# Patient Record
Sex: FEMALE | Race: WHITE | Employment: OTHER | URBAN - METROPOLITAN AREA
[De-identification: names, ages, dates, MRNs, and addresses within clinical notes are randomized per-mention and may not be internally consistent; named-entity substitution may affect disease eponyms.]

---

## 2020-11-10 ENCOUNTER — HOSPITAL ENCOUNTER (OUTPATIENT)
Dept: LAB | Age: 70
Discharge: HOME | End: 2020-11-10
Payer: MEDICARE

## 2020-11-10 LAB — VIT B12 SERPL-MCNC: 1248 PG/ML (ref 211–911)

## 2020-11-10 PROCEDURE — 82607 VITAMIN B-12: CPT

## 2020-11-10 PROCEDURE — 36415 COLL VENOUS BLD VENIPUNCTURE: CPT

## 2020-11-11 LAB
FAX TO INFO,FAXT: NORMAL
FAX TO NUMBER,FAXN: NORMAL

## 2021-12-28 ENCOUNTER — TRANSCRIBE ORDER (OUTPATIENT)
Dept: SCHEDULING | Age: 71
End: 2021-12-28

## 2021-12-28 DIAGNOSIS — M79.605 LEFT LEG PAIN: Primary | ICD-10-CM

## 2022-02-04 ENCOUNTER — HOSPITAL ENCOUNTER (OUTPATIENT)
Dept: VASCULAR SURGERY | Age: 72
Discharge: HOME | End: 2022-02-04
Attending: PHYSICIAN ASSISTANT
Payer: MEDICARE

## 2022-02-04 DIAGNOSIS — M79.605 LEFT LEG PAIN: ICD-10-CM

## 2022-02-04 PROCEDURE — 93971 EXTREMITY STUDY: CPT

## 2024-04-29 ENCOUNTER — HOSPITAL ENCOUNTER (OUTPATIENT)
Facility: HOSPITAL | Age: 74
Discharge: HOME | End: 2024-05-02
Payer: MEDICARE

## 2024-04-29 ENCOUNTER — TRANSCRIBE ORDERS (OUTPATIENT)
Facility: HOSPITAL | Age: 74
End: 2024-04-29

## 2024-04-29 DIAGNOSIS — R41.3 MEMORY LOSS: Primary | ICD-10-CM

## 2024-04-29 DIAGNOSIS — R41.3 MEMORY LOSS: ICD-10-CM

## 2024-04-29 LAB
TSH SERPL DL<=0.05 MIU/L-ACNC: 0.59 UIU/ML (ref 0.36–3.74)
VIT B12 SERPL-MCNC: 675 PG/ML (ref 211–911)

## 2024-04-29 PROCEDURE — 82607 VITAMIN B-12: CPT

## 2024-04-29 PROCEDURE — 84443 ASSAY THYROID STIM HORMONE: CPT

## 2024-04-29 PROCEDURE — 36415 COLL VENOUS BLD VENIPUNCTURE: CPT

## 2024-05-13 ENCOUNTER — TELEPHONE (OUTPATIENT)
Age: 74
End: 2024-05-13

## 2024-05-13 NOTE — TELEPHONE ENCOUNTER
Returned call to the patient's daughter. Informed that a referral is needed prior to scheduling. She states she will contact [facility] and have them resend referral.

## 2024-05-13 NOTE — TELEPHONE ENCOUNTER
Sandra Shoen/Daughter states that patient was referred by Dr. Melvin from [facility] it was faxed over 2 weeks ago. Please call daughter to schedule the apptmt at [phone]

## 2024-06-07 NOTE — H&P
volume Rx bowel prep available    08/10/2023 GLIMEPIRIDE 4 MG TABLET TAKE 2 TABLETS BY MOUTH EVERY DAY N       insulin lispro (U-100) 100 unit/mL subcutaneous pen inject by subcutaneous route per prescriber's instructions. Insulin dosing requires individualization. N      05/01/2022 IPRATROPIUM 0.03% SPRAY SPRAY 1 SPRAY BY INTRANASAL ROUTE 2- 3 TIMES EVERY DAY IN EACH NOSTRIL N       isosorbide mononitrate ER 60 mg tablet,extended release 24 hr take 1 tablet by oral route  every day in the morning N      05/11/2023 Levemir U-100 Insulin 100 unit/mL subcutaneous solution INJECT 18 UNITS BY SUBCUTANEOUS ROUTE EVERY NIGHT AT BEDTIME N      02/20/2023 METFORMIN HCL 1,000 MG TABLET TAKE 1 TABLET BY MOUTH TWICE A DAY WITH BREAKFAST AND DINNER N       metoprolol succinate ER 50 mg tablet,extended release 24 hr take 1 tablet by oral route  every day N      08/11/2023 PRAMIPEXOLE 0.25 MG TABLET TAKE ONE TABLET BY MOUTH IN THE EVENING AND ONE TABLET BY MOUTH AT BEDTIME N      04/23/2023 TRAZODONE 50 MG TABLET TAKE 1 TABLET BY MOUTH EVERY DAY IN THE EVENING N        Allergies  Ingredient Reaction (Severity) Comment   BACITRACIN Bruising    BACITRACIN ZINC Bruising    NEOMYCIN SULFATE Bruising    POLYMYXIN B Bruising      Reviewed, no changes.        Review of Systems  System Neg/Pos Details   Constitutional Negative Chills, Fever, Malaise, Weight gain and Weight loss.   ENMT Negative Ear infections, Nasal congestion, Sinus Infection and Sore throat.   Eyes Negative Double vision and Eye pain.   Respiratory Negative Asthma, Chronic cough, Dyspnea, Pleuritic pain and Wheezing.   Cardio Negative Chest pain, Edema and Irregular heartbeat/palpitations.   GI Negative Abdominal pain, Change in bowel habits, Change in stool caliber, Constipation, Decreased appetite, Diarrhea, Dysphagia, Heartburn, Hematemesis, Hematochezia, Melena, Nausea, Rectal bleeding, Reflux and Vomiting.    Negative Dysuria, Hematuria, Urinary frequency,

## 2024-06-10 ENCOUNTER — HOSPITAL ENCOUNTER (OUTPATIENT)
Facility: HOSPITAL | Age: 74
Setting detail: OUTPATIENT SURGERY
Discharge: HOME | End: 2024-06-10
Attending: INTERNAL MEDICINE | Admitting: INTERNAL MEDICINE
Payer: MEDICARE

## 2024-06-10 VITALS
HEART RATE: 82 BPM | WEIGHT: 193.1 LBS | OXYGEN SATURATION: 95 % | DIASTOLIC BLOOD PRESSURE: 47 MMHG | TEMPERATURE: 98.2 F | BODY MASS INDEX: 32.17 KG/M2 | HEIGHT: 65 IN | SYSTOLIC BLOOD PRESSURE: 161 MMHG | RESPIRATION RATE: 16 BRPM

## 2024-06-10 LAB — GLUCOSE BLD STRIP.AUTO-MCNC: 104 MG/DL (ref 70–110)

## 2024-06-10 PROCEDURE — 7100000010 HC PHASE II RECOVERY - FIRST 15 MIN: Performed by: INTERNAL MEDICINE

## 2024-06-10 PROCEDURE — 7100000011 HC PHASE II RECOVERY - ADDTL 15 MIN: Performed by: INTERNAL MEDICINE

## 2024-06-10 PROCEDURE — 3600007502: Performed by: INTERNAL MEDICINE

## 2024-06-10 PROCEDURE — 3600007512: Performed by: INTERNAL MEDICINE

## 2024-06-10 PROCEDURE — 2580000003 HC RX 258: Performed by: INTERNAL MEDICINE

## 2024-06-10 PROCEDURE — 99153 MOD SED SAME PHYS/QHP EA: CPT | Performed by: INTERNAL MEDICINE

## 2024-06-10 PROCEDURE — 2709999900 HC NON-CHARGEABLE SUPPLY: Performed by: INTERNAL MEDICINE

## 2024-06-10 PROCEDURE — 99152 MOD SED SAME PHYS/QHP 5/>YRS: CPT | Performed by: INTERNAL MEDICINE

## 2024-06-10 PROCEDURE — 6360000002 HC RX W HCPCS: Performed by: INTERNAL MEDICINE

## 2024-06-10 PROCEDURE — 82962 GLUCOSE BLOOD TEST: CPT

## 2024-06-10 RX ORDER — SIMETHICONE 40MG/0.6ML
40 SUSPENSION, DROPS(FINAL DOSAGE FORM)(ML) ORAL EVERY 6 HOURS PRN
Status: DISCONTINUED | OUTPATIENT
Start: 2024-06-10 | End: 2024-06-10 | Stop reason: HOSPADM

## 2024-06-10 RX ORDER — METFORMIN HYDROCHLORIDE 500 MG/1
1000 TABLET, FILM COATED, EXTENDED RELEASE ORAL 2 TIMES DAILY WITH MEALS
COMMUNITY

## 2024-06-10 RX ORDER — NALOXONE HYDROCHLORIDE 0.4 MG/ML
0.4 INJECTION, SOLUTION INTRAMUSCULAR; INTRAVENOUS; SUBCUTANEOUS PRN
Status: DISCONTINUED | OUTPATIENT
Start: 2024-06-10 | End: 2024-06-10 | Stop reason: HOSPADM

## 2024-06-10 RX ORDER — CITALOPRAM 20 MG/1
20 TABLET ORAL DAILY
COMMUNITY

## 2024-06-10 RX ORDER — GABAPENTIN 100 MG/1
100 CAPSULE ORAL 3 TIMES DAILY
COMMUNITY

## 2024-06-10 RX ORDER — TRAZODONE HYDROCHLORIDE 50 MG/1
50 TABLET ORAL EVERY EVENING
COMMUNITY

## 2024-06-10 RX ORDER — FLUMAZENIL 0.1 MG/ML
0.2 INJECTION INTRAVENOUS ONCE
Status: DISCONTINUED | OUTPATIENT
Start: 2024-06-10 | End: 2024-06-10 | Stop reason: HOSPADM

## 2024-06-10 RX ORDER — DIPHENHYDRAMINE HYDROCHLORIDE 50 MG/ML
25 INJECTION INTRAMUSCULAR; INTRAVENOUS EVERY 6 HOURS PRN
Status: DISCONTINUED | OUTPATIENT
Start: 2024-06-10 | End: 2024-06-10 | Stop reason: HOSPADM

## 2024-06-10 RX ORDER — ENALAPRIL MALEATE 5 MG/1
5 TABLET ORAL DAILY
COMMUNITY
Start: 2013-11-01

## 2024-06-10 RX ORDER — FENTANYL CITRATE 50 UG/ML
100 INJECTION, SOLUTION INTRAMUSCULAR; INTRAVENOUS
Status: DISCONTINUED | OUTPATIENT
Start: 2024-06-10 | End: 2024-06-10 | Stop reason: HOSPADM

## 2024-06-10 RX ORDER — FENTANYL CITRATE 50 UG/ML
INJECTION, SOLUTION INTRAMUSCULAR; INTRAVENOUS PRN
Status: DISCONTINUED | OUTPATIENT
Start: 2024-06-10 | End: 2024-06-10 | Stop reason: ALTCHOICE

## 2024-06-10 RX ORDER — ISOSORBIDE MONONITRATE 60 MG/1
60 TABLET, EXTENDED RELEASE ORAL EVERY MORNING
COMMUNITY
Start: 2022-03-07

## 2024-06-10 RX ORDER — ATORVASTATIN CALCIUM 20 MG/1
20 TABLET, FILM COATED ORAL DAILY
COMMUNITY
Start: 2013-11-01

## 2024-06-10 RX ORDER — METOPROLOL SUCCINATE 50 MG/1
50 TABLET, EXTENDED RELEASE ORAL DAILY
COMMUNITY

## 2024-06-10 RX ORDER — GLYCOPYRROLATE 0.2 MG/ML
0.1 INJECTION INTRAMUSCULAR; INTRAVENOUS ONCE
Status: DISCONTINUED | OUTPATIENT
Start: 2024-06-10 | End: 2024-06-10 | Stop reason: HOSPADM

## 2024-06-10 RX ORDER — MIDAZOLAM HYDROCHLORIDE 5 MG/5ML
5 INJECTION, SOLUTION INTRAMUSCULAR; INTRAVENOUS
Status: DISCONTINUED | OUTPATIENT
Start: 2024-06-10 | End: 2024-06-10 | Stop reason: HOSPADM

## 2024-06-10 RX ORDER — SODIUM CHLORIDE 9 MG/ML
INJECTION, SOLUTION INTRAVENOUS CONTINUOUS
Status: DISCONTINUED | OUTPATIENT
Start: 2024-06-10 | End: 2024-06-10 | Stop reason: HOSPADM

## 2024-06-10 RX ORDER — EPINEPHRINE IN SOD CHLOR,ISO 1 MG/10 ML
1 SYRINGE (ML) INTRAVENOUS ONCE
Status: DISCONTINUED | OUTPATIENT
Start: 2024-06-10 | End: 2024-06-10 | Stop reason: HOSPADM

## 2024-06-10 RX ORDER — PRAMIPEXOLE DIHYDROCHLORIDE 0.25 MG/1
0.25 TABLET ORAL DAILY
COMMUNITY

## 2024-06-10 RX ORDER — MIDAZOLAM HYDROCHLORIDE 1 MG/ML
INJECTION INTRAMUSCULAR; INTRAVENOUS PRN
Status: DISCONTINUED | OUTPATIENT
Start: 2024-06-10 | End: 2024-06-10 | Stop reason: ALTCHOICE

## 2024-06-10 RX ORDER — GLIMEPIRIDE 4 MG/1
4 TABLET ORAL
COMMUNITY
Start: 2013-11-01

## 2024-06-10 RX ADMIN — SODIUM CHLORIDE: 9 INJECTION, SOLUTION INTRAVENOUS at 11:06

## 2024-06-10 ASSESSMENT — PAIN - FUNCTIONAL ASSESSMENT
PAIN_FUNCTIONAL_ASSESSMENT: 0-10
PAIN_FUNCTIONAL_ASSESSMENT: NONE - DENIES PAIN
PAIN_FUNCTIONAL_ASSESSMENT: 0-10
PAIN_FUNCTIONAL_ASSESSMENT: NONE - DENIES PAIN
PAIN_FUNCTIONAL_ASSESSMENT: 0-10

## 2024-06-10 NOTE — PROCEDURES
[facility]  Colonoscopy Procedure Report  _______________________________________________________  Patient: Liyah Cordero                                        Attending Physician: SHERI Monte MD    Patient ID: [id number]                                    Referring Physician: Nelly Baxter MD    Exam Date: 6/10/2024     Introduction: A  74 y.o. female patient, presents for inpatient Colonoscopy    Indications: Pt of Dr. Baxter, here Long-overdue for 10yr screening recall, with 3x failed attempts to complete Cologuard screening (samples unable to be processed). Last colonoscopy 2/11/2003 by Dr. Muñiz @[facility]: Rectosigmoid colon polyps removed (Patho: Hyperplastic polyps). Long-overdue for 10yr screening recall, with 3x failed attempts to complete Cologuard screening (samples unable to be processed).   No FHx of colon cancer. Asymptomatic of GI complaints. BMI: 33.2, BM: 1-2/day.  Hx of Malignant GIST - s/p Lap. Partial Gastrectomy with resection of mass on 4/11/2013 @[facility] (Dr. Maury Muñiz. Followed @[facility] by Dr. Abdi.   DM2 - oral medications and insulin - Glimepiride, Lispro, Levemir, Metformin.  HTN & HLD - Enalapril, Isosorbide, Metoprolol, Atorvastatin, ASA.   9/1/2023: HGB - 11.4L, HCT- 33.9L, MCV- 91  9/11/2023: Iron- 96, %Sat- 23, TIBC- 415, UIBC- 319, Vit B12- 656, Folate- >24.80H.    Consent: The benefits, risks, and alternatives to the procedure were discussed and informed consent was obtained from the patient.    Preparation: EKG, pulse, pulse oximetry and blood pressure were monitored throughout the procedure. ASA Classification: Class II- . The heart is an S1-S2 and regular heart rate and rhythm. Lungs are clear to auscultation and percussion. Abdomen is soft, nondistended, and nontender. Mental Status: awake, alert, and oriented to person, place, and time    Medications:  Fentanyl 100 mcg IV before procedure.  Versed 5 mg IV throughout the procedure.    Rectal Exam: Normal 
procedure.    Rectal Exam: Large anal skin tags and external hemorrhoids. No Blood.     Pathology Specimens:  0    Procedure:  The colonoscope was passed with difficulty through the anus under direct visualization and advanced to the cecum. Retroflexion is made in the ascending colon. The patient required positioning in the lef semiprone position to aid in the passage of the scope. The scope was withdrawn and the mucosa was carefully examined. The quality of the preparation was good. The views were excellent. The patient's toleration of the procedure was very good. The exam was done twice to the cecum. Total time is 17 minutes and withdrawal time is 12 minutes.    Findings:    Rectum:   Small internal hemorrhoids.  Sigmoid:   Tortuous and fixed sigmoid colon few small sigmoid diverticula.  Descending Colon:   Normal   Transverse Colon:   Normal   Ascending Colon:   Normal  Cecum:   Normal  Terminal Ileum:   Not entered.      Unplanned Events: There were no unplanned events.    Estimated Blood Loss: None  IMPLANTS: * No implants in log *  Impressions: Large anal skin tags and external hemorrhoids. Small internal hemorrhoids. Tortuous and fixed sigmoid colon few small sigmoid diverticula. Normal Mucosa. No blood, polyps or AVM found.    Complications: None; patient tolerated the procedure well.    Recommendations:  Discharge home when standard parameters are met.  Resume a high fiber diet.  Resume own medications. Avoid all NSAID's for  No need to repeat Colonoscopy after this one.  Take Miralax and/ or Colace 100 mg on regular basis if constipated    Procedure Codes:    COLONOSCOPY [ZGB270]     Endoscope Information:  Model Number(s)    [id number]   Assistant: None  Signed By: SHERI Monte MD Date: 6/10/2024

## 2024-06-10 NOTE — DISCHARGE INSTRUCTIONS
lasting longer than 4 hours or if unable to take medications  Any signs of decreased circulation or nerve impairment to extremity: change in color, persistent  numbness, tingling, coldness or increase pain  Any questions    What to do at Home:  Recommended activity: as above,     If you experience any of the following symptoms as above, please follow up with Dr. Monte.    *  Please give a list of your current medications to your Primary Care Provider.    *  Please update this list whenever your medications are discontinued, doses are      changed, or new medications (including over-the-counter products) are added.    *  Please carry medication information at all times in case of emergency situations.    These are general instructions for a healthy lifestyle:    No smoking/ No tobacco products/ Avoid exposure to second hand smoke  Surgeon General's Warning:  Quitting smoking now greatly reduces serious risk to your health.    Obesity, smoking, and sedentary lifestyle greatly increases your risk for illness    A healthy diet, regular physical exercise & weight monitoring are important for maintaining a healthy lifestyle    You may be retaining fluid if you have a history of heart failure or if you experience any of the following symptoms:  Weight gain of 3 pounds or more overnight or 5 pounds in a week, increased swelling in our hands or feet or shortness of breath while lying flat in bed.  Please call your doctor as soon as you notice any of these symptoms; do not wait until your next office visit.        The discharge information has been reviewed with the patient and spouse.  The patient and spouse verbalized understanding.  Discharge medications reviewed with the patient and spouse and appropriate educational materials and side effects teaching were provided.      Patient armband removed and

## 2024-06-10 NOTE — PRE SEDATION
Sedation Pre-Procedure Note    Patient Name: Liyah Cordero   YOB: 1950  Room/Bed: ENDO/PL  Medical Record Number: [id number]  Date: 6/10/2024   Time: 12:15 PM       Indication:  screen colon cancer    Consent: I have discussed with the patient and/or the patient representative the indication, alternatives, and the possible risks and/or complications of the planned procedure and the anesthesia methods. The patient and/or patient representative appear to understand and agree to proceed.    Vital Signs:   Vitals:    06/10/24 1049   BP: (!) 166/56   Pulse: 85   Resp: 16   Temp: 97.9 °F (36.6 °C)   SpO2: 97%       Past Medical History:   has a past medical history of Cancer (HCC), Diabetes mellitus (HCC), Hyperlipidemia, and Hypertension.    Past Surgical History:   has a past surgical history that includes Breast surgery; hysteroscopy (1984); and Gallbladder surgery (1994).    Medications:   Scheduled Meds:    flumazenil  0.2 mg IntraVENous Once    benzocaine   Mouth/Throat 4x Daily    EPINEPHrine  1 mg IntraVENous Once    glycopyrrolate  0.1 mg IntraVENous Once     Continuous Infusions:    sodium chloride 100 mL/hr at 06/10/24 1106    fentaNYL      midazolam       PRN Meds: naloxone, simethicone, diphenhydrAMINE  Home Meds:   Prior to Admission medications    Medication Sig Start Date End Date Taking? Authorizing Provider   atorvastatin (LIPITOR) 20 MG tablet Take 1 tablet by mouth daily 11/1/13  Yes ProviderElian MD   enalapril (VASOTEC) 5 MG tablet Take 1 tablet by mouth daily 11/1/13  Yes ProviderElian MD   glimepiride (AMARYL) 4 MG tablet Take 1 tablet by mouth every morning (before breakfast) 11/1/13  Yes Provider, MD Elian   isosorbide mononitrate (IMDUR) 60 MG extended release tablet Take 1 tablet by mouth every morning 3/7/22  Yes ProviderElian MD   gabapentin (NEURONTIN) 100 MG capsule Take 1 capsule by mouth 3 times daily. Max Daily Amount: 300 mg   Yes Provider

## 2024-06-13 RX ORDER — MIDAZOLAM HYDROCHLORIDE 1 MG/ML
INJECTION INTRAMUSCULAR; INTRAVENOUS PRN
Status: DISCONTINUED | OUTPATIENT
Start: 2024-06-10 | End: 2024-06-13 | Stop reason: ALTCHOICE

## 2025-01-27 ENCOUNTER — TELEPHONE (OUTPATIENT)
Age: 75
End: 2025-01-27

## 2025-01-27 NOTE — TELEPHONE ENCOUNTER
Called and left message on cell and spouses cell (home number is not correct) requesting the patient call back regarding their appointment for today, 1/27/25.  Need to either switch to a virtual visit or reschedule.  (Okay to squeeze in per provider).

## 2025-02-17 ENCOUNTER — PROCEDURE VISIT (OUTPATIENT)
Age: 75
End: 2025-02-17
Payer: MEDICARE

## 2025-02-17 ENCOUNTER — OFFICE VISIT (OUTPATIENT)
Age: 75
End: 2025-02-17
Payer: MEDICARE

## 2025-02-17 DIAGNOSIS — F33.1 MODERATE EPISODE OF RECURRENT MAJOR DEPRESSIVE DISORDER (HCC): ICD-10-CM

## 2025-02-17 DIAGNOSIS — R41.844 IMPAIRED EXECUTIVE FUNCTIONING: ICD-10-CM

## 2025-02-17 DIAGNOSIS — F41.1 GENERALIZED ANXIETY DISORDER: ICD-10-CM

## 2025-02-17 DIAGNOSIS — R41.840 ATTENTION DEFICIT: Primary | ICD-10-CM

## 2025-02-17 DIAGNOSIS — F43.9 TRAUMA AND STRESSOR-RELATED DISORDER: ICD-10-CM

## 2025-02-17 DIAGNOSIS — Z76.89 SLEEP CONCERN: ICD-10-CM

## 2025-02-17 DIAGNOSIS — R41.3 MEMORY LOSS: ICD-10-CM

## 2025-02-17 DIAGNOSIS — R41.9 NEUROCOGNITIVE DISORDER: Primary | ICD-10-CM

## 2025-02-17 PROCEDURE — 96132 NRPSYC TST EVAL PHYS/QHP 1ST: CPT | Performed by: CLINICAL NEUROPSYCHOLOGIST

## 2025-02-17 PROCEDURE — 96139 PSYCL/NRPSYC TST TECH EA: CPT | Performed by: CLINICAL NEUROPSYCHOLOGIST

## 2025-02-17 PROCEDURE — 1036F TOBACCO NON-USER: CPT | Performed by: CLINICAL NEUROPSYCHOLOGIST

## 2025-02-17 PROCEDURE — 96133 NRPSYC TST EVAL PHYS/QHP EA: CPT | Performed by: CLINICAL NEUROPSYCHOLOGIST

## 2025-02-17 PROCEDURE — 90791 PSYCH DIAGNOSTIC EVALUATION: CPT | Performed by: CLINICAL NEUROPSYCHOLOGIST

## 2025-02-17 PROCEDURE — 96138 PSYCL/NRPSYC TECH 1ST: CPT | Performed by: CLINICAL NEUROPSYCHOLOGIST

## 2025-02-17 PROCEDURE — 1123F ACP DISCUSS/DSCN MKR DOCD: CPT | Performed by: CLINICAL NEUROPSYCHOLOGIST

## 2025-02-17 NOTE — PROGRESS NOTES
Intake Note      Patient Name: Liyah Cordero  YOB: 1950    Age: 74 y.o.  Date of Intake: 1/27/2025   Education: 12 Ethnicity White   Gender: Female Referring Provider: Dr. Yu     REASON FOR REFERRAL AND EVALUATION PROCEDURES:  Liyah Cordero  was referred for evaluation by her Neurologist to assist in differential diagnosis and individualized treatment planning. she understood the rationale and procedures for evaluation, as well as the limits to confidentiality, and agreed to participate. she consented to have this report made available to her  treating providers through her  electronic medical records.   History Sources: Patient, Spouse, Relative (daughter), and Medical Record    HISTORY OF PRESENT ILLNESS:  The patient is a 74-year-old female with pertinent medical history noted for type 2 diabetes mellitus, benign essential hypertension, and hyperlipidemia.  The patient also reported history of obstructive sleep apnea, which is currently untreated.  She presented for clinical interview accompanied by her  and daughter but demonstrated fair insight and ability as a historian.  According to the patient's family, over the past 3 years, they have noticed gradually progressive declines in the patient's cognitive functioning.  They describe the declines to include attention deficits (e.g., losing her train of thought and becoming easily distracted) and executive dysfunction (e.g., disorganization and questionable decision making).  The patient's family is particularly concerned about the patient's decision making because she has given someone who has only been in their lives for 1 year approximately thirteen thousand dollars.  The patient's family stated the patient was always good-natured and she has always been willing to sacrifice herself for others but this is concerning because she does not appear to realize the ramifications of her decisions.  The patient heard what her family said

## 2025-03-06 NOTE — PROGRESS NOTES
issues can be found at www.caringinfo.org or www.agingwithdignity.org/5wishes.html  Tests empirically associated with driving performance were within normal limits.  If the patient's family is concerned about driving, a driving simulator evaluation could be considered. This could be obtained through [facility]. For more information, please call [phone].  Interview and testing revealed clinically significant symptoms of psychopathology. The patient would likely benefit from initiating counseling/psychotherapy services. There are several resources for finding a therapist, including the patient's insurance company but also the American Psychological Association's Psychologist  (.apa.org), therapist  at psychology today (therapists.psychologyApse.Connect[place].com), and the American Board of Professional Psychology (ABPP.org).   The patient reported a history of problematic sleep and testing revealed clinically significant poor sleep. The patient will be encouraged to use her CPAP consistently.  She may also benefit from short-term behaviorally oriented therapy to help improve sleep and management of medical conditions. Specifically:   Sleep Hygiene Recommendations  Avoid caffeine within 4-6 hours of bedtime  Avoid the use of nicotine close to bedtime or during the night  Do not drink alcoholic beverages within 4-6 hours of bedtime  While a light snack can promote sound sleep, avoid large meals 2-3 hours before bedtime  Minimize light, noise, and extreme temperature in the bedroom.  Stimulus Control Recommendations:   Lie down at night - only when sleepy  Use the bed only for actual sleep (or sex) - not reading, watching TV, etc.  Get out of bed if you wake up at night & cannot fall back asleep, return to bed when sleepy;  do not remain in bed awake for longer than 10 - 15 minutes  Avoid napping during the day  Adhere to a strict morning rising time, regardless of how

## 2025-03-07 ENCOUNTER — TELEMEDICINE (OUTPATIENT)
Age: 75
End: 2025-03-07

## 2025-03-07 DIAGNOSIS — F43.9 TRAUMA AND STRESSOR-RELATED DISORDER: ICD-10-CM

## 2025-03-07 DIAGNOSIS — R41.9 NEUROCOGNITIVE DISORDER: Primary | ICD-10-CM

## 2025-03-07 DIAGNOSIS — Z76.89 SLEEP CONCERN: ICD-10-CM

## 2025-03-07 NOTE — PROGRESS NOTES
through a synchronous (real-time) audio-video encounter. The patient (or guardian if applicable) is aware that this is a billable service, which includes applicable co-pays. This Virtual Visit was conducted with patient's (and/or legal guardian's) consent. Patient identification was verified, and a caregiver was present when appropriate.   The patient was located at Home: [address] [address]  Provider was located at Facility (Appt Dept): [address]  [place] [address]  Confirm you are appropriately licensed, registered, or certified to deliver care in the state where the patient is located as indicated above. If you are not or unsure, please re-schedule the visit: Yes, I confirm.        --Aleksandr Arias PSYD on 3/7/2025 at 4:19 PM    An electronic signature was used to authenticate this note.

## 2025-03-26 ENCOUNTER — HOSPITAL ENCOUNTER (OUTPATIENT)
Facility: HOSPITAL | Age: 75
Discharge: HOME | End: 2025-03-29
Payer: MEDICARE

## 2025-03-26 DIAGNOSIS — R41.3 MEMORY LOSS: ICD-10-CM

## 2025-03-26 LAB
T4 FREE SERPL-MCNC: 1.3 NG/DL (ref 0.7–1.5)
TSH SERPL DL<=0.05 MIU/L-ACNC: 1.06 UIU/ML (ref 0.36–3.74)
VIT B12 SERPL-MCNC: 534 PG/ML (ref 211–911)

## 2025-03-26 PROCEDURE — 82607 VITAMIN B-12: CPT

## 2025-03-26 PROCEDURE — 84443 ASSAY THYROID STIM HORMONE: CPT

## 2025-03-26 PROCEDURE — 84439 ASSAY OF FREE THYROXINE: CPT

## 2025-03-26 PROCEDURE — 36415 COLL VENOUS BLD VENIPUNCTURE: CPT

## 2025-03-27 LAB
FAX TO NUMBER: NORMAL
TEST RESULTS FAXED TO: NORMAL

## (undated) DEVICE — SYRINGE MEDICAL 3ML CLEAR PLASTIC STANDARD NON CONTROL LUERLOCK TIP DISPOSABLE

## (undated) DEVICE — Device

## (undated) DEVICE — SOLUTION IV 1000ML 0.9% SOD CHL PH 5 INJ USP VIAFLX PLAS

## (undated) DEVICE — CANNULA CUSH AD W/ 14FT TBG

## (undated) DEVICE — KENDALL RADIOLUCENT FOAM MONITORING ELECTRODE RECTANGULAR SHAPE: Brand: KENDALL

## (undated) DEVICE — WRISTBAND ID AD W2.5XL9.5CM RED VYN ADH CLSR UNI-PRINT

## (undated) DEVICE — BLUNTFILL: Brand: MONOJECT

## (undated) DEVICE — SYRINGE 50ML E/T

## (undated) DEVICE — CATHETER PH SUCT 14FR

## (undated) DEVICE — CATHETER IV 22GA L1IN BLU POLYUR STR HUB RADPQ PROTCT +

## (undated) DEVICE — TUBING, SUCTION, 1/4" X 12', STRAIGHT: Brand: MEDLINE

## (undated) DEVICE — SYRINGE MED 5ML STD CLR PLAS LUERLOCK TIP N CTRL DISP

## (undated) DEVICE — TOURNIQUET PHLEB W1XL18IN BLU FLAT RL AND BND REUSE FOR IV